# Patient Record
Sex: MALE | URBAN - NONMETROPOLITAN AREA
[De-identification: names, ages, dates, MRNs, and addresses within clinical notes are randomized per-mention and may not be internally consistent; named-entity substitution may affect disease eponyms.]

---

## 2020-08-20 ENCOUNTER — APPOINTMENT (OUTPATIENT)
Age: 70
Setting detail: DERMATOLOGY
End: 2020-08-20

## 2020-08-20 DIAGNOSIS — L72.0 EPIDERMAL CYST: ICD-10-CM

## 2020-08-20 DIAGNOSIS — L82.1 OTHER SEBORRHEIC KERATOSIS: ICD-10-CM

## 2020-08-20 DIAGNOSIS — L23.9 ALLERGIC CONTACT DERMATITIS, UNSPECIFIED CAUSE: ICD-10-CM

## 2020-08-20 DIAGNOSIS — L81.4 OTHER MELANIN HYPERPIGMENTATION: ICD-10-CM

## 2020-08-20 PROBLEM — D23.9 OTHER BENIGN NEOPLASM OF SKIN, UNSPECIFIED: Status: ACTIVE | Noted: 2020-08-20

## 2020-08-20 PROCEDURE — OTHER COUNSELING: OTHER

## 2020-08-20 PROCEDURE — 99203 OFFICE O/P NEW LOW 30 MIN: CPT

## 2020-08-20 PROCEDURE — OTHER EXTRACTIONS: OTHER

## 2020-08-20 PROCEDURE — OTHER REASSURANCE: OTHER

## 2020-08-20 PROCEDURE — OTHER PRESCRIPTION: OTHER

## 2020-08-20 RX ORDER — TRIAMCINOLONE ACETONIDE 1 MG/G
CREAM TOPICAL
Qty: 1 | Refills: 2 | Status: ERX | COMMUNITY
Start: 2020-08-20

## 2020-08-20 ASSESSMENT — LOCATION SIMPLE DESCRIPTION DERM
LOCATION SIMPLE: RIGHT UPPER ARM
LOCATION SIMPLE: RIGHT EYEBROW
LOCATION SIMPLE: LEFT UPPER ARM
LOCATION SIMPLE: RIGHT EYEBROW
LOCATION SIMPLE: RIGHT FOREARM
LOCATION SIMPLE: RIGHT PRETIBIAL REGION
LOCATION SIMPLE: LEFT UPPER BACK
LOCATION SIMPLE: LEFT FOREHEAD
LOCATION SIMPLE: LEFT PRETIBIAL REGION
LOCATION SIMPLE: LEFT FOREARM

## 2020-08-20 ASSESSMENT — LOCATION DETAILED DESCRIPTION DERM
LOCATION DETAILED: LEFT SUPERIOR LATERAL UPPER BACK
LOCATION DETAILED: LEFT VENTRAL LATERAL DISTAL FOREARM
LOCATION DETAILED: LEFT INFERIOR LATERAL FOREHEAD
LOCATION DETAILED: RIGHT DISTAL POSTERIOR UPPER ARM
LOCATION DETAILED: RIGHT MEDIAL EYEBROW
LOCATION DETAILED: LEFT SUPERIOR FOREHEAD
LOCATION DETAILED: RIGHT PROXIMAL PRETIBIAL REGION
LOCATION DETAILED: RIGHT VENTRAL PROXIMAL FOREARM
LOCATION DETAILED: RIGHT MEDIAL EYEBROW
LOCATION DETAILED: LEFT PROXIMAL PRETIBIAL REGION
LOCATION DETAILED: LEFT PROXIMAL POSTERIOR UPPER ARM

## 2020-08-20 ASSESSMENT — LOCATION ZONE DERM
LOCATION ZONE: ARM
LOCATION ZONE: FACE
LOCATION ZONE: TRUNK
LOCATION ZONE: LEG
LOCATION ZONE: FACE

## 2020-08-20 NOTE — PROCEDURE: EXTRACTIONS
Consent was obtained and risks were reviewed including but not limited to scarring, infection, bleeding, scabbing, incomplete removal, and allergy to anesthesia.
Prep Text (Optional): Prior to removal the treatment areas were prepped in the usual fashion.
Extraction Method: electrocautery
Acne Type: A milial cyst
Render Number Of Lesions Treated: no
Post-Care Instructions: I reviewed with the patient in detail post-care instructions. Patient is to keep the treatment areas dry overnight, and then apply bacitracin twice daily until healed. Patient may apply hydrogen peroxide soaks to remove any crusting.
Detail Level: Detailed

## 2024-02-05 PROBLEM — E11.9 DIABETES MELLITUS (MULTI): Status: ACTIVE | Noted: 2024-02-05

## 2024-02-05 PROBLEM — I10 HTN (HYPERTENSION): Status: ACTIVE | Noted: 2024-02-05

## 2024-02-05 PROBLEM — E78.5 HYPERLIPIDEMIA: Status: ACTIVE | Noted: 2024-02-05

## 2024-02-05 PROBLEM — I25.10 CORONARY ARTERY DISEASE: Status: ACTIVE | Noted: 2024-02-05

## 2024-02-05 PROBLEM — Z95.3 H/O AORTIC VALVE REPLACEMENT WITH PORCINE VALVE: Status: ACTIVE | Noted: 2024-02-05

## 2024-02-05 PROBLEM — Z95.1 S/P CABG (CORONARY ARTERY BYPASS GRAFT): Status: ACTIVE | Noted: 2024-02-05

## 2024-02-05 RX ORDER — ZINC GLUCONATE 50 MG
1 TABLET ORAL DAILY
COMMUNITY

## 2024-02-05 RX ORDER — LATANOPROST 50 UG/ML
1 SOLUTION/ DROPS OPHTHALMIC DAILY
COMMUNITY

## 2024-02-05 RX ORDER — METFORMIN HYDROCHLORIDE 1000 MG/1
1 TABLET ORAL EVERY 12 HOURS
COMMUNITY
Start: 2021-07-07 | End: 2024-04-15 | Stop reason: ALTCHOICE

## 2024-02-05 RX ORDER — ACETAMINOPHEN, DIPHENHYDRAMINE HCL, PHENYLEPHRINE HCL 325; 25; 5 MG/1; MG/1; MG/1
TABLET ORAL
COMMUNITY
Start: 2021-07-07

## 2024-02-05 RX ORDER — HUMAN INSULIN 100 [USP'U]/ML
INJECTION, SUSPENSION SUBCUTANEOUS
COMMUNITY
Start: 2021-07-07

## 2024-02-05 RX ORDER — ASPIRIN 81 MG/1
1 TABLET ORAL DAILY
COMMUNITY
Start: 2021-07-07

## 2024-02-05 RX ORDER — FLUTICASONE PROPIONATE 50 MCG
1 SPRAY, SUSPENSION (ML) NASAL DAILY
COMMUNITY

## 2024-02-05 RX ORDER — METOPROLOL TARTRATE 50 MG/1
1.5 TABLET ORAL 2 TIMES DAILY
COMMUNITY

## 2024-02-05 RX ORDER — LISINOPRIL 20 MG/1
1 TABLET ORAL DAILY
COMMUNITY

## 2024-02-05 RX ORDER — MULTIVITAMIN
1 TABLET ORAL DAILY
COMMUNITY
Start: 2021-07-07

## 2024-02-05 RX ORDER — LEVOTHYROXINE SODIUM 25 UG/1
1 TABLET ORAL DAILY
COMMUNITY
Start: 2021-07-07 | End: 2024-04-15 | Stop reason: DRUGHIGH

## 2024-02-05 RX ORDER — VIT C/E/ZN/COPPR/LUTEIN/ZEAXAN 250MG-90MG
25 CAPSULE ORAL DAILY
COMMUNITY
Start: 2021-07-07

## 2024-04-15 ENCOUNTER — OFFICE VISIT (OUTPATIENT)
Dept: CARDIOLOGY | Facility: CLINIC | Age: 74
End: 2024-04-15
Payer: OTHER GOVERNMENT

## 2024-04-15 ENCOUNTER — APPOINTMENT (OUTPATIENT)
Dept: CARDIOLOGY | Facility: CLINIC | Age: 74
End: 2024-04-15
Payer: OTHER GOVERNMENT

## 2024-04-15 VITALS
DIASTOLIC BLOOD PRESSURE: 78 MMHG | BODY MASS INDEX: 34.33 KG/M2 | HEART RATE: 68 BPM | WEIGHT: 259 LBS | SYSTOLIC BLOOD PRESSURE: 124 MMHG | HEIGHT: 73 IN

## 2024-04-15 DIAGNOSIS — I10 PRIMARY HYPERTENSION: ICD-10-CM

## 2024-04-15 DIAGNOSIS — Z95.3 H/O AORTIC VALVE REPLACEMENT WITH PORCINE VALVE: ICD-10-CM

## 2024-04-15 DIAGNOSIS — E11.9 TYPE 2 DIABETES MELLITUS WITHOUT COMPLICATION, WITH LONG-TERM CURRENT USE OF INSULIN (MULTI): ICD-10-CM

## 2024-04-15 DIAGNOSIS — E78.2 MIXED HYPERLIPIDEMIA: ICD-10-CM

## 2024-04-15 DIAGNOSIS — I25.10 CORONARY ARTERY DISEASE INVOLVING NATIVE CORONARY ARTERY OF NATIVE HEART WITHOUT ANGINA PECTORIS: Primary | ICD-10-CM

## 2024-04-15 DIAGNOSIS — Z79.4 TYPE 2 DIABETES MELLITUS WITHOUT COMPLICATION, WITH LONG-TERM CURRENT USE OF INSULIN (MULTI): ICD-10-CM

## 2024-04-15 PROCEDURE — 3074F SYST BP LT 130 MM HG: CPT | Performed by: NURSE PRACTITIONER

## 2024-04-15 PROCEDURE — 4010F ACE/ARB THERAPY RXD/TAKEN: CPT | Performed by: NURSE PRACTITIONER

## 2024-04-15 PROCEDURE — 1160F RVW MEDS BY RX/DR IN RCRD: CPT | Performed by: NURSE PRACTITIONER

## 2024-04-15 PROCEDURE — 3008F BODY MASS INDEX DOCD: CPT | Performed by: NURSE PRACTITIONER

## 2024-04-15 PROCEDURE — 3078F DIAST BP <80 MM HG: CPT | Performed by: NURSE PRACTITIONER

## 2024-04-15 PROCEDURE — 1159F MED LIST DOCD IN RCRD: CPT | Performed by: NURSE PRACTITIONER

## 2024-04-15 PROCEDURE — 99213 OFFICE O/P EST LOW 20 MIN: CPT | Performed by: NURSE PRACTITIONER

## 2024-04-15 PROCEDURE — 1036F TOBACCO NON-USER: CPT | Performed by: NURSE PRACTITIONER

## 2024-04-15 RX ORDER — LEVOTHYROXINE SODIUM 75 UG/1
75 TABLET ORAL
COMMUNITY

## 2024-04-15 RX ORDER — ATORVASTATIN CALCIUM 40 MG/1
40 TABLET, FILM COATED ORAL DAILY
COMMUNITY

## 2024-04-15 RX ORDER — EMPAGLIFLOZIN, METFORMIN HYDROCHLORIDE 12.5; 1 MG/1; MG/1
1 TABLET, EXTENDED RELEASE ORAL 2 TIMES DAILY
COMMUNITY

## 2024-04-15 ASSESSMENT — ENCOUNTER SYMPTOMS
PND: 0
ORTHOPNEA: 0
DYSPNEA ON EXERTION: 0
NEAR-SYNCOPE: 0
IRREGULAR HEARTBEAT: 0
SYNCOPE: 0
PALPITATIONS: 0

## 2024-04-15 NOTE — ASSESSMENT & PLAN NOTE
July 2021 coronary artery bypass grafting  LIMA-LAD    Current daily activity greater than 4 METS without concerning symptoms

## 2024-04-15 NOTE — PROGRESS NOTES
"Chief Complaint  \" I am doing so good\"    Reason for Visit  Annual follow-up  Patient presents to the office today for outpatient follow-up for CABG and AVR.  Last evaluated in clinic by Dr. Flower May 2023.    Presents today ambulatory with steady gait.  Accompanied by spouse  Patient denies any hospitalizations or significant changes to interval medical history since last office follow-up.   [facility] has [facility] completed earlier this year.    History of Present Illness   Patient is extremely pleasant 74-year-old gentleman who presents today for routine follow-up.  He remains aerobically active doing yard work, put 70 bags of mulch in the landscaping over the weekend, plays with his grandchildren and walks to the park on a regular basis.  He denies any exertional chest pain, no dyspnea on exertion or fatigability.    His prior bypass and AVR symptom was dyspnea on exertion and fatigability, denies any recurrence.    Patient reports that overall has no complaint(s) of chest pain, chest pressure/discomfort, exertional chest pressure/discomfort, fatigue, irregular heart beat, lower extremity edema, near-syncope, and orthopnea    Daily activity:    ADLs, yard work.  Reports improvement in exercise capacity or functional tolerance since last office visit.    The importance of secondary prevention reviewed:  HTN: Optimal  HLD: Treated  DM: Treated  Smoker: Denies  BMI:  Reviewed the merits of healthy lifestyle choices on overall cardiovascular health.    July 2021 aortic valve replacement -discharge summary reports normal LVEF postoperatively.  Is due for surveillance baseline echocardiogram, discussed rationale and in agreement to complete.    Overall patient is pleased with current state of cardiovascular health.   Discussed the dynamic nature of coronary artery disease and the importance of seeking medical attention if new symptoms arise.    Review of Systems " "  Cardiovascular:  Negative for chest pain, dyspnea on exertion, irregular heartbeat, leg swelling, near-syncope, orthopnea, palpitations, paroxysmal nocturnal dyspnea and syncope.        Visit Vitals  /78 (BP Location: Left arm, Patient Position: Sitting)   Pulse 68   Ht 1.854 m (6' 1\")   Wt 117 kg (259 lb)   BMI 34.17 kg/m²   Smoking Status Former   BSA 2.45 m²     Physical Exam  Vitals and nursing note reviewed.   Constitutional:       Appearance: Normal appearance.   Cardiovascular:      Rate and Rhythm: Normal rate and regular rhythm.      Heart sounds: Murmur heard.      Systolic murmur is present with a grade of 1/6.   Pulmonary:      Effort: Pulmonary effort is normal.      Breath sounds: Normal breath sounds.   Musculoskeletal:      Cervical back: Full passive range of motion without pain.      Right lower leg: No edema.      Left lower leg: No edema.   Skin:     General: Skin is cool.   Neurological:      Mental Status: He is alert and oriented to person, place, and time.   Psychiatric:         Attention and Perception: Attention normal.         Mood and Affect: Mood normal.         Behavior: Behavior is cooperative.      No Known Allergies    Current Outpatient Medications   Medication Instructions    aspirin 81 mg EC tablet 1 tablet, oral, Daily    atorvastatin (LIPITOR) 40 mg, oral, Daily    cholecalciferol (VITAMIN D-3) 25 mcg, oral, Daily    empagliflozin-metformin (Synjardy XR) 12.5-1,000 mg 24 hr tablet 1 tablet, oral, 2 times daily    fluticasone (Flonase) 50 mcg/actuation nasal spray 1 spray, Each Nostril, Daily    insulin NPH and regular human (NovoLIN 70/30 U-100 Insulin) 100 unit/mL (70-30) injection subcutaneous    latanoprost (Xalatan) 0.005 % ophthalmic solution 1 drop, Both Eyes, Daily    levothyroxine (SYNTHROID, LEVOXYL) 75 mcg, oral, Daily before breakfast    lisinopril 20 mg tablet 1 tablet, oral, Daily    melatonin 10 mg tablet oral    metoprolol tartrate (Lopressor) 50 mg tablet " 1.5 tablets, oral, 2 times daily    multivitamin tablet 1 tablet, oral, Daily    zinc gluconate 50 mg tablet 1 tablet, oral, Daily      Assessment:    Coronary artery disease  July 2021 coronary artery bypass grafting  LIMA-LAD    Current daily activity greater than 4 METS without concerning symptoms    H/O aortic valve replacement with porcine valve  July 2021 aortic valve replacement #27 mm Freestyle    HTN (hypertension)  Optimal in office    Hyperlipidemia  Moderate intensity statin  April 2023 LDL 73, HDL 29      Diabetes mellitus (Multi)  On statin/ACE  Reports most recent hemoglobin A1c 7.8    BMI 34.0-34.9,adult  Reviewed the merits of healthy lifestyle choices on overall cardiovascular health.      Plan:     Through informed decision making process incorporating patients unique circumstances, the following treatment plan will be initiated:    1.  Prescription drug management of cardiovascular medication for efficacy, adherence to treatment, side effect assessment and polypharmacy. Current treatment clinically warranted and to continue without modifications.    2.  Echo (surveillance AVR 2021)    3. Return for follow-up; in the interim, contact the office if new symptoms arise.  Annual follow up Dr. Zhen Jones  MSN, APRN-CNP, PMHNP-BC  [facility]    Please excuse any errors in grammar or translation related to this dictation. Voice recognition software was utilized to prepare this document.

## 2024-04-15 NOTE — LETTER
"April 15, 2024     Paula Nelson DO  [address] [facility] [address]    Patient: Bobby Pelayo   YOB: 1950   Date of Visit: 4/15/2024       Dear Dr. Paual Nelson DO:    Thank you for referring Bobby Pelayo to me for evaluation. Below are my notes for this consultation.  If you have questions, please do not hesitate to call me. I look forward to following your patient along with you.       Sincerely,     Johanne Jones, APRN-CNP      CC: No Recipients  ______________________________________________________________________________________    Chief Complaint  \" I am doing so good\"    Reason for Visit  Annual follow-up  Patient presents to the office today for outpatient follow-up for CABG and AVR.  Last evaluated in clinic by Dr. Flower May 2023.    Presents today ambulatory with steady gait.  Accompanied by spouse  Patient denies any hospitalizations or significant changes to interval medical history since last office follow-up.   [facility], has had wellness labs completed earlier this year.    History of Present Illness   Patient is extremely pleasant 74-year-old gentleman who presents today for routine follow-up.  He remains aerobically active doing yard work, put 70 bags of mulch in the landscaping over the weekend, plays with his grandchildren and walks to the park on a regular basis.  He denies any exertional chest pain, no dyspnea on exertion or fatigability.    His prior bypass and AVR symptom was dyspnea on exertion and fatigability, denies any recurrence.    Patient reports that overall has no complaint(s) of chest pain, chest pressure/discomfort, exertional chest pressure/discomfort, fatigue, irregular heart beat, lower extremity edema, near-syncope, and orthopnea    Daily activity:    ADLs, yard work.  Reports improvement in exercise capacity or functional tolerance since last office visit.    The importance of secondary prevention " "reviewed:  HTN: Optimal  HLD: Treated  DM: Treated  Smoker: Denies  BMI:  Reviewed the merits of healthy lifestyle choices on overall cardiovascular health.    July 2021 aortic valve replacement -discharge summary reports normal LVEF postoperatively.  Is due for surveillance baseline echocardiogram, discussed rationale and in agreement to complete.    Overall patient is pleased with current state of cardiovascular health.   Discussed the dynamic nature of coronary artery disease and the importance of seeking medical attention if new symptoms arise.    Review of Systems   Cardiovascular:  Negative for chest pain, dyspnea on exertion, irregular heartbeat, leg swelling, near-syncope, orthopnea, palpitations, paroxysmal nocturnal dyspnea and syncope.        Visit Vitals  /78 (BP Location: Left arm, Patient Position: Sitting)   Pulse 68   Ht 1.854 m (6' 1\")   Wt 117 kg (259 lb)   BMI 34.17 kg/m²   Smoking Status Former   BSA 2.45 m²     Physical Exam  Vitals and nursing note reviewed.   Constitutional:       Appearance: Normal appearance.   Cardiovascular:      Rate and Rhythm: Normal rate and regular rhythm.      Heart sounds: Murmur heard.      Systolic murmur is present with a grade of 1/6.   Pulmonary:      Effort: Pulmonary effort is normal.      Breath sounds: Normal breath sounds.   Musculoskeletal:      Cervical back: Full passive range of motion without pain.      Right lower leg: No edema.      Left lower leg: No edema.   Skin:     General: Skin is cool.   Neurological:      Mental Status: He is alert and oriented to person, place, and time.   Psychiatric:         Attention and Perception: Attention normal.         Mood and Affect: Mood normal.         Behavior: Behavior is cooperative.      No Known Allergies    Current Outpatient Medications   Medication Instructions   • aspirin 81 mg EC tablet 1 tablet, oral, Daily   • atorvastatin (LIPITOR) 40 mg, oral, Daily   • cholecalciferol (VITAMIN D-3) 25 mcg, " oral, Daily   • empagliflozin-metformin (Synjardy XR) 12.5-1,000 mg 24 hr tablet 1 tablet, oral, 2 times daily   • fluticasone (Flonase) 50 mcg/actuation nasal spray 1 spray, Each Nostril, Daily   • insulin NPH and regular human (NovoLIN 70/30 U-100 Insulin) 100 unit/mL (70-30) injection subcutaneous   • latanoprost (Xalatan) 0.005 % ophthalmic solution 1 drop, Both Eyes, Daily   • levothyroxine (SYNTHROID, LEVOXYL) 75 mcg, oral, Daily before breakfast   • lisinopril 20 mg tablet 1 tablet, oral, Daily   • melatonin 10 mg tablet oral   • metoprolol tartrate (Lopressor) 50 mg tablet 1.5 tablets, oral, 2 times daily   • multivitamin tablet 1 tablet, oral, Daily   • zinc gluconate 50 mg tablet 1 tablet, oral, Daily      Assessment:    Coronary artery disease  July 2021 coronary artery bypass grafting  LIMA-LAD    Current daily activity greater than 4 METS without concerning symptoms    H/O aortic valve replacement with porcine valve  July 2021 aortic valve replacement #27 mm Freestyle    HTN (hypertension)  Optimal in office    Hyperlipidemia  Moderate intensity statin  April 2023 LDL 73, HDL 29      Diabetes mellitus (Multi)  On statin/ACE  Reports most recent hemoglobin A1c 7.8    BMI 34.0-34.9,adult  Reviewed the merits of healthy lifestyle choices on overall cardiovascular health.      Plan:     Through informed decision making process incorporating patients unique circumstances, the following treatment plan will be initiated:    1.  Prescription drug management of cardiovascular medication for efficacy, adherence to treatment, side effect assessment and polypharmacy. Current treatment clinically warranted and to continue without modifications.    2.  Echo (surveillance AVR 2021)    3. Return for follow-up; in the interim, contact the office if new symptoms arise.  Annual follow up Dr. Zhen Jones  MSN, APRN-CNP, PMHNP-BC  [facility]    Please excuse any errors in grammar or  translation related to this dictation. Voice recognition software was utilized to prepare this document.

## 2024-04-15 NOTE — PATIENT INSTRUCTIONS
Please bring all medicines, vitamins, and herbal supplements with you when you come to the office.    Prescriptions will not be filled unless you are compliant with your follow up appointments or have a follow up appointment scheduled as per instruction of your physician. Refills should be requested at the time of your visit.     PLAN:   Through informed decision making process incorporating patients unique circumstances, the following treatment plan will be initiated:    1.  Prescription drug management of cardiovascular medication for efficacy, adherence to treatment, side effect assessment and polypharmacy. Current treatment clinically warranted and to continue without modifications.    2.  Echo (surveillance AVR 2021)    3. Return for follow-up; in the interim, contact the office if new symptoms arise.  Annual follow up Dr. Fontaine

## 2024-06-19 ENCOUNTER — HOSPITAL ENCOUNTER (OUTPATIENT)
Dept: CARDIOLOGY | Facility: CLINIC | Age: 74
Discharge: HOME | End: 2024-06-19
Payer: OTHER GOVERNMENT

## 2024-06-19 VITALS
BODY MASS INDEX: 34.33 KG/M2 | WEIGHT: 259 LBS | SYSTOLIC BLOOD PRESSURE: 132 MMHG | HEIGHT: 73 IN | DIASTOLIC BLOOD PRESSURE: 82 MMHG

## 2024-06-19 DIAGNOSIS — I10 PRIMARY HYPERTENSION: ICD-10-CM

## 2024-06-19 DIAGNOSIS — Z95.3 H/O AORTIC VALVE REPLACEMENT WITH PORCINE VALVE: ICD-10-CM

## 2024-06-19 LAB
AORTIC VALVE MEAN GRADIENT: 10 MMHG
AORTIC VALVE PEAK VELOCITY: 2.26 M/S
AV PEAK GRADIENT: 20.4 MMHG
AVA (PEAK VEL): 2.17 CM2
AVA (VTI): 2.38 CM2
EJECTION FRACTION APICAL 4 CHAMBER: 72.2
LEFT VENTRICLE INTERNAL DIMENSION DIASTOLE: 4.73 CM (ref 3.5–6)
LEFT VENTRICULAR OUTFLOW TRACT DIAMETER: 2.5 CM
MITRAL VALVE E/A RATIO: 1.12
MITRAL VALVE E/E' RATIO: 16

## 2024-06-19 PROCEDURE — 93306 TTE W/DOPPLER COMPLETE: CPT

## 2024-06-19 PROCEDURE — 93306 TTE W/DOPPLER COMPLETE: CPT | Performed by: INTERNAL MEDICINE

## 2024-06-20 ENCOUNTER — TELEPHONE (OUTPATIENT)
Dept: CARDIOLOGY | Facility: CLINIC | Age: 74
End: 2024-06-20
Payer: OTHER GOVERNMENT

## 2024-06-20 NOTE — TELEPHONE ENCOUNTER
----- Message from RONEY Johnson sent at 6/20/2024  8:23 AM EDT -----  Please let him know the heart valve is working appropriately  No concerns with the echo  Enjoy the Summer and stay keep hydrated this week.   ----- Message -----  From: Edwin Syngo - Cardiology Results In  Sent: 6/19/2024   6:11 PM EDT  To: RONEY Johnson

## 2024-06-20 NOTE — TELEPHONE ENCOUNTER
Result Communication    Resulted Orders   Transthoracic Echo Complete   Result Value Ref Range    AV mn grad 10.0 mmHg    AV pk samy 2.26 m/s    LVOT diam 2.50 cm    MV E/A ratio 1.12     MV avg E/e' ratio 16.00     LVIDd 4.73 cm    Aortic Valve Area by Continuity of Peak Velocity 2.17 cm2    AV pk grad 20.4 mmHg    Aortic Valve Area by Continuity of VTI 2.38 cm2    LV A4C EF 72.2     Narrative                   [facility]  [address] [facility], [address] [place] [address]          Tel [phone] Fax [phone]    TRANSTHORACIC ECHOCARDIOGRAM REPORT       Patient Name:      JERSON Gamez Physician:    [id number] Francisco Ang MD  Study Date:        6/19/2024             Ordering Provider:    [id number] TOAN PERDOMO  MRN/PID:           [id number]              Fellow:  Accession#:        [id number]          Nurse:  Date of Birth/Age: 1950 / 74 years  Sonographer:          JACOB  Gender:            M                     Additional Staff:  Height:            190.50 cm             Admit Date:  Weight:            117.48 kg             Admission Status:  BSA / BMI:         2.45 m2 / 32.37 kg/m2 Department Location:  [facility]  Blood Pressure: 132 /82 mmHg    Study Type:    TRANSTHORACIC ECHO (TTE) COMPLETE  Diagnosis/ICD: 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       PHYSICIAN INTERPRETATION:  Left Ventricle: Left ventricular systolic function is  normal, with an estimated ejection fraction of 70%. There are no regional wall motion abnormalities. The left ventricular cavity size is normal. Left ventricular diastolic filling was not assessed.  Left Atrium: The left atrium is mildly dilated.  Right Ventricle: The right ventricle is mildly enlarged. There is normal right ventricular global systolic function.  Right Atrium: The right atrium is normal in size.  Aortic Valve: The aortic valve is trileaflet. There is evidence of mild aortic valve stenosis.  There is trivial aortic valve regurgitation. The peak instantaneous gradient of the aortic valve is 20.4 mmHg. The mean gradient of the aortic valve is 10.0 mmHg.  Mitral Valve: The mitral valve is mildly thickened. There is trace mitral valve regurgitation.  Tricuspid Valve: The tricuspid valve is structurally normal. No evidence of tricuspid regurgitation.  Pulmonic Valve: The pulmonic valve is not well visualized. There is no indication of pulmonic valve regurgitation.  Pericardium: There is no pericardial effusion noted.  Aorta: The aortic root is normal.       CONCLUSIONS:   1. Left ventricular systolic function is normal with a 70% estimated ejection fraction.   2. Mild aortic valve stenosis.    QUANTITATIVE DATA SUMMARY:  2D MEASUREMENTS:                           Normal Ranges:  Ao Root d:     2.80 cm   (2.0-3.7cm)  LAs:           4.30 cm   (2.7-4.0cm)  RVIDd:         3.76 cm   (0.9-3.6cm)  IVSd:          1.11 cm   (0.6-1.1cm)  LVPWd:         1.05 cm   (0.6-1.1cm)  LVIDd:         4.73 cm   (3.9-5.9cm)  LVIDs:         3.04 cm  LV Mass Index: 75.4 g/m2  LV % FS        35.7 %    LV SYSTOLIC FUNCTION BY 2D PLANIMETRY (MOD):                      Normal Ranges:  EF-A4C View: 72.2 % (>=55%)    LV DIASTOLIC FUNCTION:                         Normal Ranges:  MV Peak E:    1.14 m/s (0.7-1.2 m/s)  MV Peak A:    1.02 m/s (0.42-0.7 m/s)  E/A Ratio:    1.12     (1.0-2.2)  MV lateral e' 0.07 m/s  MV medial e'  0.04  m/s  E/e' Ratio:   16.00    (<8.0)    MITRAL VALVE:                       Normal Ranges:  MV Vmax:    1.16 m/s (<=1.3m/s)  MV peak P.4 mmHg (<5mmHg)  MV mean P.0 mmHg (<48mmHg)    MITRAL INSUFFICIENCY:                       Normal Ranges:  MR Vmax: 512.00 cm/s  dP/dt:   1298 mmHg/s (>1200mmHg/sec)    AORTIC VALVE:                                     Normal Ranges:  AoV Vmax:                2.26 m/s  (<=1.7m/s)  AoV Peak P.4 mmHg (<20mmHg)  AoV Mean PG:             10.0 mmHg (1.7-11.5mmHg)  LVOT Max James:            1.00 m/s  (<=1.1m/s)  AoV VTI:                 47.80 cm  (18-25cm)  LVOT VTI:                23.20 cm  LVOT Diameter:           2.50 cm   (1.8-2.4cm)  AoV Area, VTI:           2.38 cm2  (2.5-5.5cm2)  AoV Area,Vmax:           2.17 cm2  (2.5-4.5cm2)  AoV Dimensionless Index: 0.49    AORTIC INSUFFICIENCY:  AI Vmax:       4.12 m/s  AI Half-time:  469 msec  AI Decel Rate: 221.00 cm/s2    PULMONIC VALVE:                       Normal Ranges:  PV Max James: 0.6 m/s  (0.6-0.9m/s)  PV Max P.6 mmHg       [id number] Francisco Ang MD  Electronically signed on 2024 at 6:11:26 PM         ** Final **         9:12 AM      Results were successfully communicated with the patient and they acknowledged their understanding.

## 2025-04-17 ENCOUNTER — APPOINTMENT (OUTPATIENT)
Dept: CARDIOLOGY | Facility: CLINIC | Age: 75
End: 2025-04-17
Payer: OTHER GOVERNMENT

## 2025-04-17 VITALS
WEIGHT: 259.2 LBS | HEIGHT: 73 IN | HEART RATE: 64 BPM | SYSTOLIC BLOOD PRESSURE: 130 MMHG | BODY MASS INDEX: 34.35 KG/M2 | DIASTOLIC BLOOD PRESSURE: 78 MMHG

## 2025-04-17 DIAGNOSIS — Z79.4 TYPE 2 DIABETES MELLITUS WITHOUT COMPLICATION, WITH LONG-TERM CURRENT USE OF INSULIN: ICD-10-CM

## 2025-04-17 DIAGNOSIS — Z87.891 FORMER CIGARETTE SMOKER: ICD-10-CM

## 2025-04-17 DIAGNOSIS — Z76.89 ESTABLISHING CARE WITH NEW DOCTOR, ENCOUNTER FOR: ICD-10-CM

## 2025-04-17 DIAGNOSIS — I10 PRIMARY HYPERTENSION: ICD-10-CM

## 2025-04-17 DIAGNOSIS — I25.810 CORONARY ARTERY DISEASE INVOLVING CORONARY BYPASS GRAFT OF NATIVE HEART WITHOUT ANGINA PECTORIS: ICD-10-CM

## 2025-04-17 DIAGNOSIS — E78.2 MIXED HYPERLIPIDEMIA: ICD-10-CM

## 2025-04-17 DIAGNOSIS — E11.9 TYPE 2 DIABETES MELLITUS WITHOUT COMPLICATION, WITH LONG-TERM CURRENT USE OF INSULIN: ICD-10-CM

## 2025-04-17 RX ORDER — LISINOPRIL 20 MG/1
20 TABLET ORAL DAILY
Qty: 90 TABLET | Refills: 3 | Status: SHIPPED | OUTPATIENT
Start: 2025-04-17 | End: 2026-04-17

## 2025-04-17 RX ORDER — METOPROLOL TARTRATE 50 MG/1
75 TABLET ORAL 2 TIMES DAILY
Qty: 270 TABLET | Refills: 3 | Status: SHIPPED | OUTPATIENT
Start: 2025-04-17 | End: 2026-04-17

## 2025-04-17 RX ORDER — ATORVASTATIN CALCIUM 40 MG/1
40 TABLET, FILM COATED ORAL DAILY
Qty: 90 TABLET | Refills: 3 | Status: SHIPPED | OUTPATIENT
Start: 2025-04-17 | End: 2026-04-17

## 2025-04-17 NOTE — PATIENT INSTRUCTIONS
Please bring all medicines, vitamins, and herbal supplements with you when you come to the office.    Prescriptions will not be filled unless you are compliant with your follow up appointments or have a follow up appointment scheduled as per instruction of your physician. Refills should be requested at the time of your visit.     BMI was above normal measurement. Current weight: 118 kg (259 lb 3.2 oz)  Weight change since last visit (-) denotes wt loss 0.2 lbs   Weight loss needed to achieve BMI 25: 70.1 Lbs  Weight loss needed to achieve BMI 30: 32.3 Lbs  Provided instructions on dietary changes  Provided instructions on exercise.

## 2025-04-17 NOTE — LETTER
April 20, 2025     Paula Nelson DO  [address] [facility] [address]    Patient: Bobby Pelayo   YOB: 1950   Date of Visit: 4/17/2025       Dear Dr. Paula Nelson, :    Thank you for referring Bobby Pelayo to me for evaluation. Below are my notes for this consultation.  If you have questions, please do not hesitate to call me. I look forward to following your patient along with you.       Sincerely,     Earline Fontaine MD      CC: No Recipients  ______________________________________________________________________________________        Chief Complaint:    Chief Complaint   Patient presents with   • Follow-up      1 year for coronary artery disease   Patient is new to this provider.  Prior notes reviewed.  75-year-old, with history of CABG, AVR 2021, primary hypertension, insulin requiring diabetes, hypothyroidism, presents for follow-up.  Extremely pleasant, accompanied by wife to the office.  Subjective :     Review of Systems   Constitutional:  Negative for chills, diaphoresis and fever.   HENT: Negative.  Negative for drooling, ear discharge, mouth sores and trouble swallowing.    Eyes: Negative.  Negative for discharge.   Respiratory:  Negative for choking and stridor.    Cardiovascular:         Interval review of systems is negative for chest discomfort pressure tightness heaviness palpitations lightheadedness orthopnea paroxysmal nocturnal dyspnea dependent edema or claudication TIA or CVA type symptoms or bleeding diathesis   Gastrointestinal:  Negative for abdominal pain and vomiting.   Endocrine: Negative.  Negative for cold intolerance and heat intolerance.   Genitourinary: Negative.  Negative for hematuria.   Musculoskeletal:  Negative for joint swelling.   Skin:  Negative for pallor.   Allergic/Immunologic: Negative.  Negative for immunocompromised state.   Neurological:  Negative for syncope and speech difficulty.   Hematological: Negative.   "  Psychiatric/Behavioral:  Negative for suicidal ideas.         History so Far :    Coronary artery disease  July 2021 coronary artery bypass grafting  LIMA-LAD  Cardiac catheterization June 2021-severe calcific aortic stenosis, left dominant system, mid LAD 70% stenosis, otherwise normal.  Current daily activity greater than 4 METS without concerning symptoms     H/O aortic valve replacement with porcine valve  July 2021 aortic valve replacement #27 mm Freestyle  Echocardiogram June 2024-LVEF 70% mildly dilated left atrium, mildly dilated right ventricle with normal RV systolic function, left atrial diameter 4.3 cm trivial aortic regurgitation, mild aortic stenosis, peak and mean gradients 20 and 10 mmHg respectively, no tricuspid regurgitation hence PA pressure not estimated, trace mitral regurgitation is noted       HTN (hypertension)   Hyperlipidemia  Diabetes mellitus (Multi)  BMI 34.0-34.9,adult  Reviewed the merits of healthy lifestyle choices on overall cardiovascular health.          Objective   Wt Readings from Last 3 Encounters:   04/17/25 118 kg (259 lb 3.2 oz)   06/19/24 117 kg (259 lb)   04/15/24 117 kg (259 lb)        Vitals:    04/17/25 1013   BP: 130/78   BP Location: Left arm   Patient Position: Sitting   Pulse: 64   Weight: 118 kg (259 lb 3.2 oz)   Height: 1.854 m (6' 1\")           Physical Exam:    GENERAL APPEARANCE: in no acute distress.  CHEST: Symmetric and non-tender.  Well-healed anterior sternal scar of prior coronary artery bypass grafting is noted.  INTEGUMENT: Skin warm and dry  HEENT: No gross abnormalities identified.No pallor or scleral icterus.  NECK: Supple, no JVD, no bruit.   NEURO/PSHCY: Alert and oriented x3; appropriate behavior and responses and responses  LUNGS: Clear to auscultation bilaterally; normal respiratory effort.  HEART: Rate and rhythm regular with grade 1/6 crescendo decrescendo murmur along the left sternal border; no gallop appreciated.   ABDOMEN: Soft, non " tender.  MUSCULOSKELETAL: No gross deformities.  EXTREMITIES: Warm  There is no edema noted.    Meds:  Current Outpatient Medications   Medication Instructions   • aspirin 81 mg EC tablet 1 tablet, Daily   • atorvastatin (LIPITOR) 40 mg, Daily   • cholecalciferol (VITAMIN D-3) 25 mcg, Daily   • empagliflozin-metformin (Synjardy XR) 12.5-1,000 mg 24 hr tablet 1 tablet, 2 times daily   • fluticasone (Flonase) 50 mcg/actuation nasal spray 1 spray, Daily   • insulin NPH and regular human (NovoLIN 70/30 U-100 Insulin) 100 unit/mL (70-30) injection Inject under the skin.   • latanoprost (Xalatan) 0.005 % ophthalmic solution 1 drop, Daily   • levothyroxine (SYNTHROID, LEVOXYL) 75 mcg, Daily before breakfast   • lisinopril 20 mg tablet 1 tablet, Daily   • melatonin 10 mg tablet Take by mouth.   • metoprolol tartrate (Lopressor) 50 mg tablet 1.5 tablets, 2 times daily   • multivitamin tablet 1 tablet, Daily   • zinc gluconate 50 mg tablet 1 tablet, Daily          Allergies[1]      Reviewed all available pertinent laboratory data and diagnostic testing results that occurred after the last office visit with me  No recent laboratory data for review.              Assessment:    1. Coronary artery disease involving coronary bypass graft of native heart without angina pectoris  Follow Up In Cardiology      2. Primary hypertension        3. Mixed hyperlipidemia        4. Type 2 diabetes mellitus without complication, with long-term current use of insulin        5. Establishing care with new doctor, encounter for        6. BMI 34.0-34.9,adult        7. Former cigarette smoker             Clinical Decision Making:    Atherosclerotic native vessel coronary artery disease with history of coronary artery bypass grafting, no angina pectoris, remains physically active, following heart healthy lifestyle.  Status post aortic valve replacement, mild stenosis, no symptoms, continue to observe, uses bacterial endocarditis prophylaxis  Mixed  hyperlipidemia-on statin therapy recent laboratory data not available  Type 2 diabetes without symptoms of hypoglycemia, laboratory data not available.  Obtain labs to include comprehensive profile and lipid profile.    Follow up : 1 year                I, Mary KONG LPN  am scribing for, and in the presence of Dr. Earline Fontaine MD, FACC.       I, Dr. Earline Fontaine MD, FACC, personally performed the services described in the documentation as scribed by Mary KONG LPN in my presence, and confirm it is both accurate and complete.               [1]  No Known Allergies

## 2025-04-17 NOTE — PROGRESS NOTES
Chief Complaint:    Chief Complaint   Patient presents with    Follow-up      1 year for coronary artery disease   Patient is new to this provider.  Prior notes reviewed.  75-year-old, with history of CABG, AVR 2021, primary hypertension, insulin requiring diabetes, hypothyroidism, presents for follow-up.  Extremely pleasant, accompanied by wife to the office.  Subjective :     Review of Systems   Constitutional:  Negative for chills, diaphoresis and fever.   HENT: Negative.  Negative for drooling, ear discharge, mouth sores and trouble swallowing.    Eyes: Negative.  Negative for discharge.   Respiratory:  Negative for choking and stridor.    Cardiovascular:         Interval review of systems is negative for chest discomfort pressure tightness heaviness palpitations lightheadedness orthopnea paroxysmal nocturnal dyspnea dependent edema or claudication TIA or CVA type symptoms or bleeding diathesis   Gastrointestinal:  Negative for abdominal pain and vomiting.   Endocrine: Negative.  Negative for cold intolerance and heat intolerance.   Genitourinary: Negative.  Negative for hematuria.   Musculoskeletal:  Negative for joint swelling.   Skin:  Negative for pallor.   Allergic/Immunologic: Negative.  Negative for immunocompromised state.   Neurological:  Negative for syncope and speech difficulty.   Hematological: Negative.    Psychiatric/Behavioral:  Negative for suicidal ideas.         History so Far :    Coronary artery disease  July 2021 coronary artery bypass grafting  LIMA-LAD  Cardiac catheterization June 2021-severe calcific aortic stenosis, left dominant system, mid LAD 70% stenosis, otherwise normal.  Current daily activity greater than 4 METS without concerning symptoms     H/O aortic valve replacement with porcine valve  July 2021 aortic valve replacement #27 mm Freestyle  Echocardiogram June 2024-LVEF 70% mildly dilated left atrium, mildly dilated right ventricle with normal RV systolic function, left  "atrial diameter 4.3 cm trivial aortic regurgitation, mild aortic stenosis, peak and mean gradients 20 and 10 mmHg respectively, no tricuspid regurgitation hence PA pressure not estimated, trace mitral regurgitation is noted       HTN (hypertension)   Hyperlipidemia  Diabetes mellitus (Multi)  BMI 34.0-34.9,adult  Reviewed the merits of healthy lifestyle choices on overall cardiovascular health.          Objective   Wt Readings from Last 3 Encounters:   04/17/25 118 kg (259 lb 3.2 oz)   06/19/24 117 kg (259 lb)   04/15/24 117 kg (259 lb)        Vitals:    04/17/25 1013   BP: 130/78   BP Location: Left arm   Patient Position: Sitting   Pulse: 64   Weight: 118 kg (259 lb 3.2 oz)   Height: 1.854 m (6' 1\")           Physical Exam:    GENERAL APPEARANCE: in no acute distress.  CHEST: Symmetric and non-tender.  Well-healed anterior sternal scar of prior coronary artery bypass grafting is noted.  INTEGUMENT: Skin warm and dry  HEENT: No gross abnormalities identified.No pallor or scleral icterus.  NECK: Supple, no JVD, no bruit.   NEURO/PSHCY: Alert and oriented x3; appropriate behavior and responses and responses  LUNGS: Clear to auscultation bilaterally; normal respiratory effort.  HEART: Rate and rhythm regular with grade 1/6 crescendo decrescendo murmur along the left sternal border; no gallop appreciated.   ABDOMEN: Soft, non tender.  MUSCULOSKELETAL: No gross deformities.  EXTREMITIES: Warm  There is no edema noted.    Meds:  Current Outpatient Medications   Medication Instructions    aspirin 81 mg EC tablet 1 tablet, Daily    atorvastatin (LIPITOR) 40 mg, Daily    cholecalciferol (VITAMIN D-3) 25 mcg, Daily    empagliflozin-metformin (Synjardy XR) 12.5-1,000 mg 24 hr tablet 1 tablet, 2 times daily    fluticasone (Flonase) 50 mcg/actuation nasal spray 1 spray, Daily    insulin NPH and regular human (NovoLIN 70/30 U-100 Insulin) 100 unit/mL (70-30) injection Inject under the skin.    latanoprost (Xalatan) 0.005 % " ophthalmic solution 1 drop, Daily    levothyroxine (SYNTHROID, LEVOXYL) 75 mcg, Daily before breakfast    lisinopril 20 mg tablet 1 tablet, Daily    melatonin 10 mg tablet Take by mouth.    metoprolol tartrate (Lopressor) 50 mg tablet 1.5 tablets, 2 times daily    multivitamin tablet 1 tablet, Daily    zinc gluconate 50 mg tablet 1 tablet, Daily          Allergies[1]      Reviewed all available pertinent laboratory data and diagnostic testing results that occurred after the last office visit with me  No recent laboratory data for review.              Assessment:    1. Coronary artery disease involving coronary bypass graft of native heart without angina pectoris  Follow Up In Cardiology      2. Primary hypertension        3. Mixed hyperlipidemia        4. Type 2 diabetes mellitus without complication, with long-term current use of insulin        5. Establishing care with new doctor, encounter for        6. BMI 34.0-34.9,adult        7. Former cigarette smoker             Clinical Decision Making:    Atherosclerotic native vessel coronary artery disease with history of coronary artery bypass grafting, no angina pectoris, remains physically active, following heart healthy lifestyle.  Status post aortic valve replacement, mild stenosis, no symptoms, continue to observe, uses bacterial endocarditis prophylaxis  Mixed hyperlipidemia-on statin therapy recent laboratory data not available  Type 2 diabetes without symptoms of hypoglycemia, laboratory data not available.  Obtain labs to include comprehensive profile and lipid profile.    Follow up : 1 year                IMary LPN  am scribing for, and in the presence of Dr. Earline Fontaine MD, FACC.       I, Dr. Earline Fontaine MD, FACC, personally performed the services described in the documentation as scribed by Mary KONG LPN in my presence, and confirm it is both accurate and complete.               [1] No Known Allergies

## 2025-04-20 ASSESSMENT — ENCOUNTER SYMPTOMS
SPEECH DIFFICULTY: 0
VOMITING: 0
CHILLS: 0
ENDOCRINE NEGATIVE: 1
JOINT SWELLING: 0
STRIDOR: 0
ALLERGIC/IMMUNOLOGIC NEGATIVE: 1
FEVER: 0
DIAPHORESIS: 0
CHOKING: 0
HEMATOLOGIC/LYMPHATIC NEGATIVE: 1
ABDOMINAL PAIN: 0
TROUBLE SWALLOWING: 0
EYES NEGATIVE: 1
HEMATURIA: 0
EYE DISCHARGE: 0

## 2025-04-21 ENCOUNTER — TELEPHONE (OUTPATIENT)
Dept: CARDIOLOGY | Facility: CLINIC | Age: 75
End: 2025-04-21
Payer: OTHER GOVERNMENT

## 2025-04-21 NOTE — TELEPHONE ENCOUNTER
Phoned patient and states he did [facility] and he will call to have them faxed to our office. States he has labs scheduled for next year as well that when completed will have copy sent to our office.     To Dr. Earline Fontaine MD

## 2025-04-21 NOTE — TELEPHONE ENCOUNTER
----- Message from Earline Fontaine sent at 4/20/2025  1:31 PM EDT -----  If this patient has not had recent blood work, please obtain comprehensive profile and lipid profile.  If he has had recent blood work please obtain them for review.  Prior to the 1 year follow-up visit please order comprehensive profile and lipid profile thanks

## 2025-04-23 NOTE — TELEPHONE ENCOUNTER
[facility] phones back with [facility]. Phoned [phone] and put request for labs in, they will fax to office

## 2026-04-16 ENCOUNTER — APPOINTMENT (OUTPATIENT)
Dept: CARDIOLOGY | Facility: CLINIC | Age: 76
End: 2026-04-16
Payer: OTHER GOVERNMENT